# Patient Record
Sex: MALE | Race: WHITE | NOT HISPANIC OR LATINO | Employment: FULL TIME | URBAN - METROPOLITAN AREA
[De-identification: names, ages, dates, MRNs, and addresses within clinical notes are randomized per-mention and may not be internally consistent; named-entity substitution may affect disease eponyms.]

---

## 2017-01-03 ENCOUNTER — HOSPITAL ENCOUNTER (OUTPATIENT)
Dept: NUCLEAR MEDICINE | Facility: HOSPITAL | Age: 42
Discharge: HOME | End: 2017-01-03

## 2017-01-03 ENCOUNTER — HOSPITAL ENCOUNTER (OUTPATIENT)
Dept: CARDIOLOGY | Facility: HOSPITAL | Age: 42
Discharge: HOME | End: 2017-01-03

## 2017-01-10 ENCOUNTER — OFFICE VISIT - HEALTHEAST (OUTPATIENT)
Dept: CARDIOLOGY | Facility: CLINIC | Age: 42
End: 2017-01-10

## 2017-01-10 ENCOUNTER — COMMUNICATION - HEALTHEAST (OUTPATIENT)
Dept: CARDIOLOGY | Facility: CLINIC | Age: 42
End: 2017-01-10

## 2017-01-10 DIAGNOSIS — I42.8 OTHER PRIMARY CARDIOMYOPATHIES: ICD-10-CM

## 2017-01-10 ASSESSMENT — MIFFLIN-ST. JEOR: SCORE: 2375.88

## 2017-01-12 ENCOUNTER — COMMUNICATION - HEALTHEAST (OUTPATIENT)
Dept: CARDIOLOGY | Facility: CLINIC | Age: 42
End: 2017-01-12

## 2017-02-08 ENCOUNTER — OFFICE VISIT - HEALTHEAST (OUTPATIENT)
Dept: CARDIOLOGY | Facility: CLINIC | Age: 42
End: 2017-02-08

## 2017-02-08 DIAGNOSIS — I10 ESSENTIAL HYPERTENSION: ICD-10-CM

## 2017-02-08 DIAGNOSIS — I42.8 OTHER PRIMARY CARDIOMYOPATHIES: ICD-10-CM

## 2017-02-08 ASSESSMENT — MIFFLIN-ST. JEOR: SCORE: 2376.78

## 2017-02-13 ENCOUNTER — COMMUNICATION - HEALTHEAST (OUTPATIENT)
Dept: FAMILY MEDICINE | Facility: CLINIC | Age: 42
End: 2017-02-13

## 2017-03-20 ENCOUNTER — HOSPITAL ENCOUNTER (OUTPATIENT)
Dept: CARDIOLOGY | Facility: HOSPITAL | Age: 42
Discharge: HOME | End: 2017-03-20

## 2017-03-20 DIAGNOSIS — I42.8 OTHER PRIMARY CARDIOMYOPATHIES: ICD-10-CM

## 2017-03-20 LAB
AORTIC ROOT: 3.6 CM
AORTIC VALVE MEAN VELOCITY: 66.3 CM/S
AV DIMENSIONLESS INDEX VTI: 0.7
AV MEAN GRADIENT: 2 MMHG
AV PEAK GRADIENT: 3.9 MMHG
AV VALVE AREA: 2.3 CM2
AV VELOCITY RATIO: 0.7
DOP CALC AO PEAK VEL: 98.2 CM/S
DOP CALC AO VTI: 19.4 CM
DOP CALC LVOT AREA: 3.46 CM2
DOP CALC LVOT DIAMETER: 2.1 CM
DOP CALC LVOT PEAK VEL: 72.3 CM/S
DOP CALC LVOT STROKE VOLUME: 44.3 CM3
DOP CALCLVOT PEAK VEL VTI: 12.8 CM
ECHO EJECTION FRACTION ESTIMATED: 40 %
EJECTION FRACTION: 49 % (ref 55–75)
FRACTIONAL SHORTENING: 23.9 % (ref 28–44)
INTERVENTRICULAR SEPTUM IN END DIASTOLE: 1.05 CM (ref 0.6–1)
IVS/PW RATIO: 0.8
LA AREA 1: 21.4 CM2
LA AREA 2: 18.4 CM2
LEFT ATRIUM LENGTH: 4.78 CM
LEFT ATRIUM SIZE: 3.6 CM
LEFT ATRIUM TO AORTIC ROOT RATIO: 1 NO UNITS
LEFT ATRIUM VOLUME: 70 CM3
LEFT VENTRICLE DIASTOLIC VOLUME: 66.5 CM3 (ref 62–150)
LEFT VENTRICLE SYSTOLIC VOLUME: 33.8 CM3 (ref 21–61)
LEFT VENTRICULAR INTERNAL DIMENSION IN DIASTOLE: 6.12 CM (ref 4.2–5.8)
LEFT VENTRICULAR INTERNAL DIMENSION IN SYSTOLE: 4.66 CM (ref 2.5–4)
LEFT VENTRICULAR MASS: 328.1 G
LEFT VENTRICULAR OUTFLOW TRACT MEAN GRADIENT: 1 MMHG
LEFT VENTRICULAR OUTFLOW TRACT MEAN VELOCITY: 49.1 CM/S
LEFT VENTRICULAR OUTFLOW TRACT PEAK GRADIENT: 2 MMHG
LEFT VENTRICULAR POSTERIOR WALL IN END DIASTOLE: 1.37 CM (ref 0.6–1)
MITRAL VALVE DECELERATION SLOPE: 3600 MM/S2
MITRAL VALVE E/A RATIO: 1.2
MITRAL VALVE PRESSURE HALF-TIME: 58 MS
MV DECELERATION TIME: 222 MS
MV PEAK A VELOCITY: 55.3 CM/S
MV PEAK E VELOCITY: 67.4 CM/S
MV VALVE AREA PRESSURE 1/2 METHOD: 3.8 CM2
RIGHT VENTRICULAR INTERNAL DIMENSION IN DYSTOLE: 2.56 CM
TRICUSPID VALVE ANULAR PLANE SYSTOLIC EXCURSION: 2 CM

## 2017-03-27 ENCOUNTER — OFFICE VISIT - HEALTHEAST (OUTPATIENT)
Dept: CARDIOLOGY | Facility: CLINIC | Age: 42
End: 2017-03-27

## 2017-03-27 DIAGNOSIS — I10 ESSENTIAL HYPERTENSION: ICD-10-CM

## 2017-03-27 DIAGNOSIS — I42.8 OTHER PRIMARY CARDIOMYOPATHIES: ICD-10-CM

## 2017-03-27 DIAGNOSIS — I21.9 MYOCARDIAL INFARCTION (H): ICD-10-CM

## 2017-03-27 ASSESSMENT — MIFFLIN-ST. JEOR: SCORE: 2422.14

## 2017-03-28 ENCOUNTER — COMMUNICATION - HEALTHEAST (OUTPATIENT)
Dept: CARDIOLOGY | Facility: CLINIC | Age: 42
End: 2017-03-28

## 2017-03-28 DIAGNOSIS — I10 HYPERTENSION: ICD-10-CM

## 2017-10-20 ENCOUNTER — OFFICE VISIT - HEALTHEAST (OUTPATIENT)
Dept: CARDIOLOGY | Facility: CLINIC | Age: 42
End: 2017-10-20

## 2017-10-20 DIAGNOSIS — I25.10 CAD (CORONARY ARTERY DISEASE): ICD-10-CM

## 2017-10-20 DIAGNOSIS — I50.20 HEART FAILURE WITH REDUCED EJECTION FRACTION (H): ICD-10-CM

## 2017-10-20 ASSESSMENT — MIFFLIN-ST. JEOR: SCORE: 2462.97

## 2017-10-30 ENCOUNTER — HOSPITAL ENCOUNTER (OUTPATIENT)
Dept: CARDIOLOGY | Facility: HOSPITAL | Age: 42
Discharge: HOME | End: 2017-10-30
Attending: NURSE PRACTITIONER

## 2017-10-30 DIAGNOSIS — I50.20 HEART FAILURE WITH REDUCED EJECTION FRACTION (H): ICD-10-CM

## 2017-10-30 LAB
AORTIC ROOT: 3.5 CM
BSA FOR ECHO PROCEDURE: 2.79 M2
CV BLOOD PRESSURE: NORMAL MMHG
CV ECHO HEIGHT: 71 IN
CV ECHO WEIGHT: 343 LBS
DOP CALC LVOT AREA: 4.52 CM2
DOP CALC LVOT DIAMETER: 2.4 CM
DOP CALC LVOT PEAK VEL: 65.8 CM/S
DOP CALC LVOT STROKE VOLUME: 59.2 CM3
DOP CALCLVOT PEAK VEL VTI: 13.1 CM
ECHO EJECTION FRACTION ESTIMATED: 40 %
EJECTION FRACTION: 49 % (ref 55–75)
FRACTIONAL SHORTENING: 31.2 % (ref 28–44)
INTERVENTRICULAR SEPTUM IN END DIASTOLE: 1.17 CM (ref 0.6–1)
IVS/PW RATIO: 1
LEFT ATRIUM SIZE: 4.6 CM
LEFT ATRIUM TO AORTIC ROOT RATIO: 1.31 NO UNITS
LEFT VENTRICLE DIASTOLIC VOLUME INDEX: 47 CM3/M2 (ref 34–74)
LEFT VENTRICLE DIASTOLIC VOLUME: 131 CM3 (ref 62–150)
LEFT VENTRICLE MASS INDEX: 108.8 G/M2
LEFT VENTRICLE SYSTOLIC VOLUME INDEX: 23.8 CM3/M2 (ref 11–31)
LEFT VENTRICLE SYSTOLIC VOLUME: 66.5 CM3 (ref 21–61)
LEFT VENTRICULAR INTERNAL DIMENSION IN DIASTOLE: 6 CM (ref 4.2–5.8)
LEFT VENTRICULAR INTERNAL DIMENSION IN SYSTOLE: 4.13 CM (ref 2.5–4)
LEFT VENTRICULAR MASS: 303.5 G
LEFT VENTRICULAR OUTFLOW TRACT MEAN GRADIENT: 1 MMHG
LEFT VENTRICULAR OUTFLOW TRACT MEAN VELOCITY: 44.6 CM/S
LEFT VENTRICULAR OUTFLOW TRACT PEAK GRADIENT: 2 MMHG
LEFT VENTRICULAR POSTERIOR WALL IN END DIASTOLE: 1.17 CM (ref 0.6–1)
LV STROKE VOLUME INDEX: 21.2 ML/M2
MITRAL VALVE E/A RATIO: 0.7
MV DECELERATION TIME: 324 MS
MV E'TISSUE VEL-LAT: 6.38 CM/S
MV LATERAL E/E' RATIO: 7.1
MV PEAK A VELOCITY: 64 CM/S
MV PEAK E VELOCITY: 45.6 CM/S
NUC REST DIASTOLIC VOLUME INDEX: 5488 LBS
NUC REST SYSTOLIC VOLUME INDEX: 71 IN
TRICUSPID VALVE ANULAR PLANE SYSTOLIC EXCURSION: 2 CM

## 2017-10-30 ASSESSMENT — MIFFLIN-ST. JEOR: SCORE: 2462.97

## 2017-11-01 ENCOUNTER — COMMUNICATION - HEALTHEAST (OUTPATIENT)
Dept: CARDIOLOGY | Facility: CLINIC | Age: 42
End: 2017-11-01

## 2017-11-01 DIAGNOSIS — I42.9 CARDIOMYOPATHY (H): ICD-10-CM

## 2017-11-06 ENCOUNTER — OFFICE VISIT - HEALTHEAST (OUTPATIENT)
Dept: CARDIOLOGY | Facility: CLINIC | Age: 42
End: 2017-11-06

## 2017-11-06 DIAGNOSIS — I42.9 CARDIOMYOPATHY (H): ICD-10-CM

## 2017-11-06 ASSESSMENT — MIFFLIN-ST. JEOR: SCORE: 2467.5

## 2017-11-20 ENCOUNTER — OFFICE VISIT - HEALTHEAST (OUTPATIENT)
Dept: CARDIOLOGY | Facility: CLINIC | Age: 42
End: 2017-11-20

## 2017-11-20 DIAGNOSIS — I10 ESSENTIAL HYPERTENSION: ICD-10-CM

## 2017-11-20 DIAGNOSIS — I25.5 ISCHEMIC CARDIOMYOPATHY: ICD-10-CM

## 2017-11-20 ASSESSMENT — MIFFLIN-ST. JEOR: SCORE: 2476.58

## 2019-03-06 ENCOUNTER — COMMUNICATION - HEALTHEAST (OUTPATIENT)
Dept: FAMILY MEDICINE | Facility: CLINIC | Age: 44
End: 2019-03-06

## 2020-05-28 ENCOUNTER — HOSPITAL ENCOUNTER (OUTPATIENT)
Dept: GENERAL RADIOLOGY | Facility: CLINIC | Age: 45
Discharge: HOME | End: 2020-05-28
Admitting: RADIOLOGY

## 2020-05-28 DIAGNOSIS — M54.50 LOW BACK PAIN: ICD-10-CM

## 2020-05-28 DIAGNOSIS — M99.03 SEGMENTAL AND SOMATIC DYSFUNCTION OF LUMBAR REGION: ICD-10-CM

## 2020-05-28 DIAGNOSIS — M54.50 LUMBAGO: ICD-10-CM

## 2020-05-28 PROCEDURE — 72100 X-RAY EXAM L-S SPINE 2/3 VWS: CPT

## 2020-09-03 ENCOUNTER — COMMUNICATION - HEALTHEAST (OUTPATIENT)
Dept: CARDIOLOGY | Facility: CLINIC | Age: 45
End: 2020-09-03

## 2020-09-09 ENCOUNTER — OFFICE VISIT - HEALTHEAST (OUTPATIENT)
Dept: CARDIOLOGY | Facility: CLINIC | Age: 45
End: 2020-09-09

## 2020-09-09 DIAGNOSIS — E78.2 MIXED HYPERLIPIDEMIA: ICD-10-CM

## 2020-09-09 DIAGNOSIS — I10 ESSENTIAL HYPERTENSION: ICD-10-CM

## 2020-09-09 DIAGNOSIS — I42.9 CARDIOMYOPATHY (H): ICD-10-CM

## 2020-09-09 DIAGNOSIS — I25.5 ISCHEMIC CARDIOMYOPATHY: ICD-10-CM

## 2020-09-09 DIAGNOSIS — I25.10 CORONARY ARTERY DISEASE INVOLVING NATIVE CORONARY ARTERY OF NATIVE HEART WITHOUT ANGINA PECTORIS: ICD-10-CM

## 2020-09-09 DIAGNOSIS — Z01.810 PRE-OPERATIVE CARDIOVASCULAR EXAMINATION: ICD-10-CM

## 2020-09-09 RX ORDER — LISINOPRIL 5 MG/1
5 TABLET ORAL DAILY
Qty: 31 TABLET | Refills: 11 | Status: SHIPPED | OUTPATIENT
Start: 2020-09-09

## 2020-09-09 RX ORDER — ATORVASTATIN CALCIUM 80 MG/1
80 TABLET, FILM COATED ORAL AT BEDTIME
Qty: 31 TABLET | Refills: 11 | Status: SHIPPED | OUTPATIENT
Start: 2020-09-09

## 2020-09-09 RX ORDER — ASPIRIN 81 MG/1
81 TABLET, CHEWABLE ORAL DAILY
Qty: 30 TABLET | Refills: 11 | Status: SHIPPED | COMMUNITY
Start: 2020-09-09

## 2020-09-09 RX ORDER — METOPROLOL SUCCINATE 25 MG/1
25 TABLET, EXTENDED RELEASE ORAL 2 TIMES DAILY
Qty: 30 TABLET | Refills: 11 | Status: SHIPPED | OUTPATIENT
Start: 2020-09-09

## 2020-09-09 ASSESSMENT — MIFFLIN-ST. JEOR: SCORE: 2612.65

## 2021-05-24 ENCOUNTER — RECORDS - HEALTHEAST (OUTPATIENT)
Dept: ADMINISTRATIVE | Facility: CLINIC | Age: 46
End: 2021-05-24

## 2021-05-25 ENCOUNTER — RECORDS - HEALTHEAST (OUTPATIENT)
Dept: ADMINISTRATIVE | Facility: CLINIC | Age: 46
End: 2021-05-25

## 2021-05-27 ENCOUNTER — RECORDS - HEALTHEAST (OUTPATIENT)
Dept: ADMINISTRATIVE | Facility: CLINIC | Age: 46
End: 2021-05-27

## 2021-05-28 ENCOUNTER — RECORDS - HEALTHEAST (OUTPATIENT)
Dept: ADMINISTRATIVE | Facility: CLINIC | Age: 46
End: 2021-05-28

## 2021-05-30 VITALS — HEIGHT: 71 IN | WEIGHT: 315 LBS | BODY MASS INDEX: 44.1 KG/M2

## 2021-05-30 VITALS — BODY MASS INDEX: 44.1 KG/M2 | HEIGHT: 71 IN | WEIGHT: 315 LBS

## 2021-05-31 VITALS — HEIGHT: 71 IN | WEIGHT: 315 LBS | BODY MASS INDEX: 44.1 KG/M2

## 2021-05-31 VITALS — BODY MASS INDEX: 44.1 KG/M2 | WEIGHT: 315 LBS | HEIGHT: 71 IN

## 2021-05-31 VITALS — WEIGHT: 315 LBS | BODY MASS INDEX: 44.1 KG/M2 | HEIGHT: 71 IN

## 2021-05-31 VITALS — BODY MASS INDEX: 44.1 KG/M2 | HEIGHT: 71 IN | WEIGHT: 315 LBS

## 2021-06-04 VITALS
BODY MASS INDEX: 44.1 KG/M2 | HEIGHT: 71 IN | DIASTOLIC BLOOD PRESSURE: 84 MMHG | OXYGEN SATURATION: 95 % | SYSTOLIC BLOOD PRESSURE: 128 MMHG | RESPIRATION RATE: 20 BRPM | HEART RATE: 105 BPM | WEIGHT: 315 LBS

## 2021-06-08 NOTE — PROGRESS NOTES
Assessment/Plan:     1. Ischemic cardiomyopathy with systolic dysfunction, NYHA class II: Azael Guzman appears well compensated.  We discussed monitoring heart failure symptoms, following a low-sodium diet, monitoring daily weights, and medication titration.  I increased his metoprolol to 50 mg daily today.  I encouraged him to set an alarm on his phone to remember to take his medications.  He states he takes his medications usually 5 out of 7 days.  We discussed the importance of medications.  I also encouraged him to start exercising on a regular basis.      Heart failure treatment includes:  - Beta blocker therapy with metroprolol succinate 50 mg daily  - ACEI therapy with lisinopril 5 mg daily    Follow-up in the heart failure clinic in 3 weeks    Subjective:     Azael Guzman is seen at [facility] heart failure clinic today for continued follow-up.  He follows up for ischemic cardiomyopathy with systolic dysfunction. His most recent echocardiogram was done November 18, 2016 which showed an ejection fraction of 25%.  He had a nuclear stress test on generator third 2017 which showed an ejection fraction of 37% and showed no change when compared to his previous study.  Dr. Harrington recommended coronary angiogram if he complains of chest discomfort or pain.  Past medical history is significant for hypertension, coronary artery disease, and hyperlipidemia. He had an MI in 2014 which he received PCI to his LAD.    During the last clinic visit, I increased his metoprolol to 50 mg daily.  He states he did not increase his metoprolol at our last visit.  He continues to have mild dyspnea on exertion.  He denies any other acute heart failure symptoms.  He denies fatigue, lightheadedness, shortness of breath, orthopnea, PND, palpitations, chest pain, abdominal fullness/bloating and lower extremity edema.      He is monitoring home weights which are stable between 321-322 pounds.  He is following a  low sodium diet.     Review of Systems:   General: WNL  Eyes: WNL  Ears/Nose/Throat: WNL  Lungs: WNL  Heart: WNL  Stomach: WNL  Bladder: WNL  Muscle/Joints: WNL  Skin: WNL  Nervous System: WNL  Mental Health: WNL     Blood: WNL     Patient Active Problem List   Diagnosis     Obesity     Coronary Artery Disease     Cardiomyopathy     Warts     Plantar Warts     Limb Pain     Contusion Of The Left Hand With Intact Skin Surface     Contusion Of The Right Leg With Intact Skin Surface     Hypertension     Hyperlipidemia     Scrotal trauma       Past Medical History   Diagnosis Date     Cardiomyopathy      Coronary artery disease      High cholesterol      Hyperlipidemia      Hypertension      MI (myocardial infarction) 5/2014     Seizure      at age [age]. none since       Past Surgical History   Procedure Laterality Date     Coronary stent placement  5/14     LAD     Joint replacement       Cardiac catheterization       Orchiectomy Right 7/31/2015     Procedure: Scrotal Exploration, Evacuation of Hematoma, Repair of Right Testical;  Surgeon: Azael Woods MD;  Location: [facility];  Service:      Angioplasty         Family History   Problem Relation Age of Onset     Heart disease Mother      Heart disease Maternal Aunt      Heart attack Maternal Uncle      Heart disease Maternal Grandmother      Heart disease Maternal Grandfather        Social History     Social History     Marital status: Single     Spouse name: N/A     Number of children: N/A     Years of education: N/A     Occupational History     Not on file.     Social History Main Topics     Smoking status: Former Smoker     Quit date: 5/18/2014     Smokeless tobacco: Never Used     Alcohol use Yes      Comment: once a month     Drug use: No     Sexual activity: Not on file     Other Topics Concern     Not on file     Social History Narrative       Current Outpatient Prescriptions   Medication Sig Dispense Refill     aspirin 81 MG EC tablet Take 1  "tablet (81 mg total) by mouth daily. 150 tablet 2     atorvastatin (LIPITOR) 80 MG tablet Take 1 tablet (80 mg total) by mouth bedtime. 90 tablet 4     lisinopril (PRINIVIL,ZESTRIL) 5 MG tablet Take 1 tablet (5 mg total) by mouth daily. 90 tablet 0     metoprolol succinate (TOPROL-XL) 25 MG Take 2 tablets (50 mg total) by mouth daily. 60 tablet 11     nitroglycerin (NITROSTAT) 0.4 MG SL tablet Place 1 tablet (0.4 mg total) under the tongue every 5 (five) minutes as needed for chest pain. 25 tablet 2     No current facility-administered medications for this visit.        Allergies   Allergen Reactions     Codeine      \"emotional lability\"       Objective:     Vitals:    01/10/17 1259   BP: 128/80   Pulse: 92   Resp: 16     Wt Readings from Last 3 Encounters:   01/10/17 (!) 323 lb 12.8 oz (146.9 kg)   12/27/16 (!) 300 lb (136.1 kg)   12/07/16 (!) 322 lb (146.1 kg)       General Appearance:   Alert, cooperative and in no acute distress.   HEENT:  No scleral icterus; the mucous membranes were pink and moist.   Neck: JVP is difficult to assess due to the patient's obesity and body habitus.    Chest: The spine was straight. The chest was symmetric.   Lungs:   Respirations unlabored; the lungs are clear to auscultation.   Cardiovascular:   Regular rhythm. S1 and S2 without murmur, clicks or rubs. Radial and posterior tibial pulses are intact and symmetrical.    Abdomen:  Soft, nontender, nondistended, bowel sounds present   Extremities: No cyanosis, clubbing, or edema.   Skin: No xanthelasma.   Neurologic: Mood and affect are appropriate.         Lab Review   Lab Results   Component Value Date    CREATININE 0.99 12/07/2016    BUN 15 12/07/2016     12/07/2016    K 4.9 12/07/2016     12/07/2016    CO2 21 (L) 12/07/2016     No results found for: BNP  No results found for: BNP  CREATININE (mg/dL)   Date Value   12/07/2016 0.99   07/31/2015 0.95   01/27/2015 0.92   01/26/2015 0.83 "       Cardiographics  Echocardiogram: 11/18/2016  Summary  Left ventricular ejection fraction is visually estimated to be 25 %.  Overall left ventricular systolic function is severely depressed.  No significant valvular abnormalities.    NM pharmacological stress test: 1/3/2017  Comments     LVEF 37%    A prior study was conducted on 11/3/2014.    This study has no change when compared with the prior study         25 minutes were spent face to face with the patient with greater than 50% spent on education and counseling.      Rosina Robles, [facility]   Heart Failure Clinic

## 2021-06-08 NOTE — PROGRESS NOTES
Assessment/Plan:     1. Ischemic cardiomyopathy with systolic dysfunction, NYHA class II: Azael Guzman appears well compensated.  We discussed monitoring heart failure symptoms, following a low-sodium diet, monitoring daily weights, and heart failure treatment.  No changes to medications due to symptoms of dizziness now improved.  I have ordered an echocardiogram to reassess LVEF.       Heart failure treatment includes:  - Beta blocker therapy with metroprolol succinate 50 mg every other day and 25 mg the other days  - ACEI therapy with lisinopril 5 mg daily    Follow-up in the heart failure clinic after your echocardiogram    Subjective:     Azael Guzman is [facility] heart failure clinic today for continued follow-up. He follows up for ischemic cardiomyopathy with systolic dysfunction. His most recent echocardiogram was done November 18, 2016 which showed an ejection fraction of 25%.  He had a nuclear stress test on generator third 2017 which showed an ejection fraction of 37% and showed no change when compared to his previous study. Dr. Harrington recommended coronary angiogram if he complains of chest discomfort or pain. Past medical history is significant for hypertension, coronary artery disease, and hyperlipidemia. He had an MI in 2014 which he received PCI to his LAD.     During the last clinic visit, I increased his metoprolol to 50 mg daily.  He called the clinic a few days after the increase and complained of headaches and dizziness.  He was taking metoprolol 50 mg one day and then 25 mg the other day which has helped his symptoms.  He continues to have chronic fatigue and dyspnea on exertion which have not worsened.  He denies lightheadedness, shortness of breath, orthopnea, PND, palpitations, chest pain, abdominal fullness/bloating and lower extremity edema.      He is monitoring home weights which are stable between 315-320 pounds.  He is following a low sodium diet.      Review of Systems:   General: WNL  Eyes: WNL  Ears/Nose/Throat: WNL  Lungs: WNL  Heart: WNL  Stomach: WNL  Bladder: WNL  Muscle/Joints: WNL  Skin: WNL  Nervous System: WNL  Mental Health: WNL     Blood: WNL     Patient Active Problem List   Diagnosis     Obesity     Coronary Artery Disease     Cardiomyopathy     Warts     Plantar Warts     Limb Pain     Contusion Of The Left Hand With Intact Skin Surface     Contusion Of The Right Leg With Intact Skin Surface     Hypertension     Hyperlipidemia     Scrotal trauma       Past Medical History:   Diagnosis Date     Cardiomyopathy      Coronary artery disease      High cholesterol      Hyperlipidemia      Hypertension      MI (myocardial infarction) 5/2014     Seizure     at age [age]. none since       Past Surgical History:   Procedure Laterality Date     ANGIOPLASTY       CARDIAC CATHETERIZATION       CORONARY STENT PLACEMENT  5/14    LAD     JOINT REPLACEMENT       ORCHIECTOMY Right 7/31/2015    Procedure: Scrotal Exploration, Evacuation of Hematoma, Repair of Right Testical;  Surgeon: Azael Woods MD;  Location: [facility] [place];  Service:        Family History   Problem Relation Age of Onset     Heart disease Mother      Heart disease Maternal Aunt      Heart attack Maternal Uncle      Heart disease Maternal Grandmother      Heart disease Maternal Grandfather        Social History     Social History     Marital status: Single     Spouse name: N/A     Number of children: N/A     Years of education: N/A     Occupational History     Not on file.     Social History Main Topics     Smoking status: Former Smoker     Quit date: 5/18/2014     Smokeless tobacco: Never Used     Alcohol use Yes      Comment: once a month     Drug use: No     Sexual activity: Not on file     Other Topics Concern     Not on file     Social History Narrative       Current Outpatient Prescriptions   Medication Sig Dispense Refill     aspirin 81 MG EC tablet Take 1 tablet (81 mg  "total) by mouth daily. 150 tablet 2     atorvastatin (LIPITOR) 80 MG tablet Take 1 tablet (80 mg total) by mouth bedtime. 90 tablet 4     lisinopril (PRINIVIL,ZESTRIL) 5 MG tablet Take 1 tablet (5 mg total) by mouth daily. 30 tablet 11     metoprolol succinate (TOPROL-XL) 25 MG Take 2 tablets (50 mg total) by mouth daily. 60 tablet 11     nitroglycerin (NITROSTAT) 0.4 MG SL tablet Place 1 tablet (0.4 mg total) under the tongue every 5 (five) minutes as needed for chest pain. 25 tablet 2     No current facility-administered medications for this visit.        Allergies   Allergen Reactions     Codeine      \"emotional lability\"       Objective:     Vitals:    02/08/17 1527   BP: 120/74   Pulse: 88   Resp: 18   SpO2: 99%     Wt Readings from Last 3 Encounters:   02/08/17 (!) 324 lb (147 kg)   01/10/17 (!) 323 lb 12.8 oz (146.9 kg)   12/27/16 (!) 300 lb (136.1 kg)       General Appearance:   Alert, cooperative and in no acute distress.   HEENT:  No scleral icterus; the mucous membranes were pink and moist.   Neck: JVP is difficult to assess due to the patient's obesity and body habitus.    Chest: The spine was straight. The chest was symmetric.   Lungs:   Respirations unlabored; the lungs are clear to auscultation.   Cardiovascular:   Regular rhythm. S1 and S2 without murmur, clicks or rubs. Radial and posterior tibial pulses are intact and symmetrical.    Abdomen:  Soft, nontender, nondistended, bowel sounds present   Extremities: No cyanosis, clubbing, or edema.   Skin: No xanthelasma.   Neurologic: Mood and affect are appropriate.         Lab Review   Lab Results   Component Value Date    CREATININE 0.99 12/07/2016    BUN 15 12/07/2016     12/07/2016    K 4.9 12/07/2016     12/07/2016    CO2 21 (L) 12/07/2016     No results found for: BNP  No results found for: BNP  CREATININE (mg/dL)   Date Value   12/07/2016 0.99   07/31/2015 0.95   01/27/2015 0.92   01/26/2015 0.83       Cardiographics  Echocardiogram: " 11/18/2016  Summary  Left ventricular ejection fraction is visually estimated to be 25 %.  Overall left ventricular systolic function is severely depressed.  No significant valvular abnormalities.     NM pharmacological stress test: 1/3/2017  Comments     LVEF 37%    A prior study was conducted on 11/3/2014.    This study has no change when compared with the prior study                  25 minutes were spent face to face with the patient with greater than 50% spent on education and counseling.      Rosina Robles, [facility]   Heart Failure Clinic

## 2021-06-09 NOTE — PROGRESS NOTES
Assessment/Plan:     1. Ischemic cardiomyopathy with systolic dysfunction, NYHA class II: Azael Guzman appears well compensated.  No signs of fluid retention today.  We discussed the results of his past echocardiogram.  His heart function has improved from 25-40%.  I increase his lisinopril to 10 mg daily.  BMP pending.  I encouraged him to call the clinic if he experiences any dizziness or lightheadedness.  I encouraged him to start weighing himself on a daily basis and trying to follow a low-sodium diet more strictly.  I also encouraged him to start exercising on a regular basis.      Heart failure treatment includes:  - Beta blocker therapy with metroprolol succinate 50 mg every other day and 25 mg the other days  - ACEI therapy with lisinopril 10 mg daily    Follow-up in the heart failure clinic in 3 weeks    Subjective:     Azael Guzman is seen at [facility] heart failure clinic today for continued follow-up.  His mother accompanies him today.  He follows up for ischemic cardiomyopathy with systolic dysfunction.  His most recent echocardiogram was done March 20, 2017 which showed ejection fraction of 40%.  This is improved from his last echocardiogram that was done November 18, 2016 which showed ejection fraction of 25%. Past medical history is significant for hypertension, coronary artery disease, and hyperlipidemia. He had an MI in 2014 which he received PCI to his LAD.     Today, he comes in with complaints of dyspnea on exertion that has worsened over the last few weeks.  He states his dizziness has improved.  He also has symptoms of fatigue.  He denies any other acute heart failure symptoms.  He denies lightheadedness, shortness of breath, orthopnea, PND, palpitations, chest pain, abdominal fullness/bloating and lower extremity edema.      He is not monitoring home weights.  He is trying to follow a low sodium diet.     Review of Systems:   General: WNL  Eyes:  WNL  Ears/Nose/Throat: WNL  Lungs: Cough, Shortness of Breath  Heart: Shortness of Breath with activity  Stomach: WNL  Bladder: WNL  Muscle/Joints: WNL  Skin: WNL  Nervous System: WNL  Mental Health: WNL     Blood: WNL     Patient Active Problem List   Diagnosis     Obesity     Coronary Artery Disease     Cardiomyopathy     Warts     Plantar Warts     Limb Pain     Contusion Of The Left Hand With Intact Skin Surface     Contusion Of The Right Leg With Intact Skin Surface     Hypertension     Hyperlipidemia     Scrotal trauma     Myocardial infarction       Past Medical History:   Diagnosis Date     Cardiomyopathy      Coronary artery disease      High cholesterol      Hyperlipidemia      Hypertension      MI (myocardial infarction) 5/2014     Seizure     at age [age]. none since       Past Surgical History:   Procedure Laterality Date     ANGIOPLASTY       CARDIAC CATHETERIZATION       CORONARY STENT PLACEMENT  5/14    LAD     JOINT REPLACEMENT       ORCHIECTOMY Right 7/31/2015    Procedure: Scrotal Exploration, Evacuation of Hematoma, Repair of Right Testical;  Surgeon: Azael Woods MD;  Location: [facility];  Service:        Family History   Problem Relation Age of Onset     Heart disease Mother      Heart disease Maternal Aunt      Heart attack Maternal Uncle      Heart disease Maternal Grandmother      Heart disease Maternal Grandfather        Social History     Social History     Marital status: Single     Spouse name: N/A     Number of children: N/A     Years of education: N/A     Occupational History     Not on file.     Social History Main Topics     Smoking status: Former Smoker     Quit date: 5/18/2014     Smokeless tobacco: Never Used     Alcohol use Yes      Comment: once a month     Drug use: No     Sexual activity: Not on file     Other Topics Concern     Not on file     Social History Narrative       Current Outpatient Prescriptions   Medication Sig Dispense Refill     aspirin 81 MG  "EC tablet Take 1 tablet (81 mg total) by mouth daily. 150 tablet 2     atorvastatin (LIPITOR) 80 MG tablet Take 1 tablet (80 mg total) by mouth bedtime. 90 tablet 4     metoprolol succinate (TOPROL-XL) 25 MG Take 2 tablets (50 mg total) by mouth daily. 60 tablet 11     nitroglycerin (NITROSTAT) 0.4 MG SL tablet Place 1 tablet (0.4 mg total) under the tongue every 5 (five) minutes as needed for chest pain. 25 tablet 2     lisinopril (PRINIVIL,ZESTRIL) 10 MG tablet Take 1 tablet (10 mg total) by mouth daily. 90 tablet 3     No current facility-administered medications for this visit.        Allergies   Allergen Reactions     Codeine      \"emotional lability\"       Objective:     Vitals:    03/27/17 1338   BP: 140/82   Pulse: 90   SpO2: 95%     Wt Readings from Last 3 Encounters:   03/27/17 (!) 334 lb (151.5 kg)   02/08/17 (!) 324 lb (147 kg)   01/10/17 (!) 323 lb 12.8 oz (146.9 kg)       General Appearance:   Alert, cooperative and in no acute distress.   HEENT:  No scleral icterus; the mucous membranes were pink and moist.   Neck: JVP is difficult to assess due to the patient's obesity and body habitus.    Chest: The spine was straight. The chest was symmetric.   Lungs:   Respirations unlabored; the lungs are clear to auscultation.   Cardiovascular:   Regular rhythm. S1 and S2 without murmur, clicks or rubs. Radial and posterior tibial pulses are intact and symmetrical.    Abdomen:  Soft, nontender, nondistended, bowel sounds present   Extremities: No cyanosis, clubbing, or edema.   Skin: No xanthelasma.   Neurologic: Mood and affect are appropriate.         Lab Review   BMP pending          Cardiographics  Echocardiogram: 3/20/2017  Summary     Left Ventricle: The estimated left ventricular ejection fraction is 40%. This represents a moderately decreased ejection fraction. Cavity is mildly increased.    Right Ventricle: Normal size and systolic function. TAPSE is normal, which is consistent with normal right " ventricular systolic function.    No hemodynamically significant valvular heart abnormalities.    When compared to the previous study dated 11/18/2016, there is mild interval improvement in LF systolic function.              25 minutes were spent face to face with the patient with greater than 50% spent on education and counseling.      Rosina Robles, [facility]   Heart Failure Clinic

## 2021-06-11 NOTE — TELEPHONE ENCOUNTER
"Incoming call from patient whom needs to start Testosterone therapy, and needs to be seen by Cardiology before they will start. Pt last seen by Cardiology 3 years ago, so needs a \"new consult spot\". Informed and discussed with patient. Search by  for a sooner appointment with a cardiologist, and given 2 options for tomorrow at [facility]. Pt appt changed to tomorrow, patient appreciative of the call and help. Instructed patient to bring labs and paperwork from the [facility]; labs and documentation needed to start this testosterone therapy. Pt verbalized understanding. Wellness screen completed at time of call. ROBERT,Rn   "

## 2021-06-11 NOTE — TELEPHONE ENCOUNTER
----- Message from Carol De León sent at 9/2/2020  3:07 PM CDT -----  General phone call:    Caller: Patient  Primary cardiologist: Balbir Singh  Detailed reason for call: Patient is scheduled to establish care with Balbir Singh on 9/16. Patient would like phone call to discuss needing authorization from Heart provider regarding his testosterone care at [facility] in [place].  New or active symptoms? High testosterone levels  Best phone number: [phone]  Best time to contact: anythime  Ok to leave a detailed message? yes  Device? no    Additional Info: [facility] phone: [phone] fax: [phone]

## 2021-06-11 NOTE — TELEPHONE ENCOUNTER
Wellness Screening Tool  Symptom Screening:  Do you have one of the following NEW symptoms:    Fever (subjective or >100.0)?  No    A new cough?  No    Shortness of breath?  No     Chills? No     New loss of taste or smell? No     Generalized body aches? No     New persistent headache? No     New sore throat? No     Nausea, vomiting, or diarrhea?  No    Within the past 2 weeks, have you been exposed to someone with a known positive illness below:    COVID-19 (known or suspected)?  No    Chicken pox?  No    Mealses?  No    Pertussis?  No    Patient notified of visitor policy- They may have one person accompany them to their appointment, but they will need to wear a mask and will be screened upon arrival for symptoms: Yes  Pt informed to wear a mask: Yes  Pt notified if they develop any symptoms listed above, prior to their appointment, they are to call the clinic directly at [phone] for further instructions.  Yes  Patient's appointment status: Patient will be seen in clinic as scheduled on 9/9/20. CMM,Rn

## 2021-06-13 NOTE — PROGRESS NOTES
[facility] Heart Care Note    Assessment / Plan:    Mr Guzman has established coronary disease, having had an anterior myocardial infarction in 2014.  He has a mild to moderate persistent cardiomyopathy, confirmed on his recent transthoracic echocardiogram.    He will therefore benefit from ongoing beta-blockade as well as ACE inhibition.  He has had difficulty with medication compliance in the past, and the importance of adherence to this regimen was emphasized.  He also knows that the doses of the medications will be gradually uptitrated over the coming months through the heart failure clinic.    He was also provided prescription for atorvastatin 80 mg daily.  It was explained that he will be on these medications indefinitely.      Thank you for the opportunity to participate in the care of Azael Guzman. Please do not hesitate to call with any questions or concerns regarding his cardiovascular status.    ______________________________________________________________________    Subjective:    It was a pleasure to see Mr. Guzman in followup for his coronary artery   disease.       Mr. Guzman is a pleasant 41-year-old gentleman who presented with an acute   anterior myocardial infarction in 05/2014. It appears that he had symptoms   for about 5 to 8 hours before presenting to the [facility] emergency   department where he was found to have anterior ST elevation with an elevated   troponin. He was transferred to [facility] and urgent coronary angiography   was performed. This showed a completely occluded LAD, which was successfully   revascularized with the patient receiving Promus 3.0 x 0.20 mm drug-eluting   stent.      He has generally done well since then. He denies any recurrent chest discomfort or shortness of breath.  He is trying to lose weight which he states has been difficult.  He has also had difficulty paying for his medications, and last year stopped all his medications.  These have since been  restarted gradually.  Currently he is on aspirin 81 mg daily and Toprol-XL 25 mg daily.    His recent repeat echocardiogram did show a persistent cardiomyopathy with ejection fraction of 40%.  He will therefore also benefit from ACE inhibition, and was started on lisinopril 5 mg daily.    Finally, the importance of statin therapy was emphasized, and a prescription for atorvastatin 80 mg daily was again provided.       ______________________________________________________________________    Problem List:  Patient Active Problem List   Diagnosis     Obesity     Coronary Artery Disease     Cardiomyopathy     Warts     Plantar Warts     Limb Pain     Contusion Of The Left Hand With Intact Skin Surface     Contusion Of The Right Leg With Intact Skin Surface     Hypertension     Hyperlipidemia     Scrotal trauma     Myocardial infarction       Medical History:  Past Medical History:   Diagnosis Date     Cardiomyopathy      Coronary artery disease      High cholesterol      Hyperlipidemia      Hypertension      MI (myocardial infarction) 5/2014     Seizure     at age [age]. none since       Surgical History:  Past Surgical History:   Procedure Laterality Date     ANGIOPLASTY       CARDIAC CATHETERIZATION       CORONARY STENT PLACEMENT  5/14    LAD     JOINT REPLACEMENT       ORCHIECTOMY Right 7/31/2015    Procedure: Scrotal Exploration, Evacuation of Hematoma, Repair of Right Testical;  Surgeon: Azael Woods MD;  Location: [facility];  Service:        Social History:  Social History     Social History     Marital status: Single     Spouse name: N/A     Number of children: N/A     Years of education: N/A     Occupational History     Not on file.     Social History Main Topics     Smoking status: Former Smoker     Quit date: 5/18/2014     Smokeless tobacco: Never Used     Alcohol use Yes      Comment: once a month     Drug use: No     Sexual activity: Not on file     Other Topics Concern     Not on file  "    Social History Narrative       Review of Systems: 12 organ system review done and negative except as noted in the HPI.    Family History:  Family History   Problem Relation Age of Onset     Heart disease Mother      Heart disease Maternal Aunt      Heart attack Maternal Uncle      Heart disease Maternal Grandmother      Heart disease Maternal Grandfather          Allergies:  Allergies   Allergen Reactions     Codeine      \"emotional lability\"       Medications:  Current Outpatient Prescriptions   Medication Sig Dispense Refill     aspirin 81 mg chewable tablet Chew 81 mg daily.       metoprolol succinate (TOPROL-XL) 25 MG Take 1 tablet (25 mg total) by mouth daily. 90 tablet 4     atorvastatin (LIPITOR) 80 MG tablet Take 1 tablet (80 mg total) by mouth at bedtime. 31 tablet 11     lisinopril (PRINIVIL,ZESTRIL) 5 MG tablet Take 1 tablet (5 mg total) by mouth daily. 31 tablet 11     No current facility-administered medications for this visit.        Objective:   Vital signs:  /80 (Patient Site: Left Arm, Patient Position: Sitting, Cuff Size: Adult Large)  Pulse 72  Resp 18  Ht 5' 11\" (1.803 m)  Wt (!) 344 lb (156 kg)  BMI 47.98 kg/m2      Physical Exam:    GENERAL APPEARANCE: Alert, cooperative and in no acute distress.  HEENT: No scleral icterus. No Xanthelasma. Oral mucuos membranes pink and moist.  NECK: No JVD. Thyroid not visualized  CHEST: clear to auscultation  CARDIOVASCULAR: S1, S2 regular. No significant murmur noted.   PULSES: Radial and posterior tibial pulses are intact and symmetric.   ABDOMEN: Nontender. BS+. No bruits.  EXTREMITIES: No cyanosis, clubbing or edema.  SKIN: Warm, well perfused  NEURO: Grossly nonfocal    Lab Results:  LIPIDS:  Lab Results   Component Value Date    CHOL 134 07/06/2015    CHOL 202 (H) 05/19/2014     Lab Results   Component Value Date    HDL 34 (L) 07/06/2015    HDL 27 (L) 05/19/2014     Lab Results   Component Value Date    LDLCALC 51 07/06/2015    LDLCALC " 123 05/19/2014     Lab Results   Component Value Date    TRIG 247 (H) 07/06/2015    TRIG 259 (H) 05/19/2014     No components found for: CHOLHDL    BMP:  Lab Results   Component Value Date    CREATININE 1.00 10/20/2017    BUN 9 10/20/2017     10/20/2017    K 4.7 10/20/2017     (H) 10/20/2017    CO2 25 10/20/2017         ECG and Cath films independently reviewed      RAJEEV CROSS MD  [facility]

## 2021-06-13 NOTE — PROGRESS NOTES
Assessment/Plan:     1. Ischemic cardiomyopathy with systolic dysfunction, NYHA class II: Azael Guzman appears well compensated.  He has no signs and symptoms of fluid retention except he occasionally gets short of breath with exertion and fatigue.  His last echo done in March 2017 showed an EF of 40%.  Patient reported that he stopped taking all his heart failure medications 2 months ago because he ran out of the medication and never refilled it.  Patient does not monitor his weight at home but he has gained about 10 pounds since he was last seen in heart failure clinic in March 2017.  He reported not being compliant with the low-salt intake.  Patient also requested for work restriction letter in regards to his previous MI and heart failure.    No medication changes made today.  Checking his BMP, BNP, and TSH level.  I encouraged patient to stay on a low-sodium diet less than 2 g per day, monitor weight daily, and stay physically active as tolerated.  Patient was recommended to have a repeat echo ASAP which he agreed.  Will discuss with CHRISTIANNE Almaguer about the letter for work restriction.      Heart failure treatment includes:  - Beta blocker therapy with metroprolol succinate 50 mg daily-not taking for the last 2 months  - ACEI therapy with lisinopril 10 mg daily-not taking for the last 2 months.  - Last BMP was done on 3/27/2017 and was stable.  - Diuretic therapy-none    2. CAD with s/p MI in 2014 with PCI to LAD: Patient stopped taking atorvastatin.  He has been taking aspirin 81 daily.  He stated he ran out of prescription 2 months ago and decided not to take it.  He also stated that he feels better without taking the medication.  Patient reported occasional chest tingling sensation lasts for 5 minutes not associated with shortness of breath, radiation towards arm or neck, lightheaded or dizziness.  He reports feeling fatigue occasionally with dyspnea on exertion.  He was last seen by Dr. Harrington in  November 2016.  He denies having any chest pain today.      Follow-up with Dr. Harrington in 2-4 weeks or sooner if possible.  Follow-up in heart failure clinic in 6 weeks.  Patient was recommended to go to the emergency department if having persistent chest pain  and shortness of breath.    Subjective:     Azael Guzman is seen at [facility] heart failure clinic today for continued follow.  Patient has missed appointment with CHRISTIANNE Ricci  about 4 times already.  He follows up for his ischemic cardiomyopathy with systolic dysfunction.  His most recent echo done in March done and 17 showed an EF of 40%.  His past medical history is significant for HTN, CAD with MI in 2014, S/P PCI to his LAD, and hyperlipidemia.  Patient has last seen Dr. Harrington in November 2016 and he was last seen in heart failure clinic and March 2017.    During the last HF clinic visit, his lisinopril dose was increased to 10 mg daily.  He had a repeat BMP that was stable.  Patient was recommended to follow-up in 3 weeks but he never followed through.  Today patient comes into heart failure clinic stating that he stopped taking all his heart failure medication and his atorvastatin for the last 2 months because he ran out of the prescription and never refilled it.  He further reported that he feels better not taking medications.  Patient reported that he has been having occasional chest tingling that lasts for 5 minutes.  He also reports occasional fatigue and shortness of breath on exertion.  He has also been noncompliant with the low-salt diet and has been gaining weight.  He gained about 10 pounds since he was last seen in March 2017. He denies lightheadedness, shortness of breath, orthopnea, PND, palpitations, chest pain, abdominal fullness/bloating and lower extremity edema.  Patient reported he was hit by a ball and developed a bruise on his left lower quadrant abdominal area.    Patient works 2 jobs.  He does not monitor his  weight at home or does not do any regular physical exercise.    Review of Systems:   General: WNL  Eyes: WNL  Ears/Nose/Throat: WNL  Lungs: WNL  Heart: Arm Pain, Shortness of Breath with activity, Irregular Heartbeat, fatigue, occasional chest tingling and shortness of breath.  Stomach: WNL  Bladder: WNL  Muscle/Joints: WNL  Skin: WNL  Nervous System: WNL  Mental Health: WNL     Blood: WNL     Patient Active Problem List   Diagnosis     Obesity     Coronary Artery Disease     Cardiomyopathy     Warts     Plantar Warts     Limb Pain     Contusion Of The Left Hand With Intact Skin Surface     Contusion Of The Right Leg With Intact Skin Surface     Hypertension     Hyperlipidemia     Scrotal trauma     Myocardial infarction       Past Medical History:   Diagnosis Date     Cardiomyopathy      Coronary artery disease      High cholesterol      Hyperlipidemia      Hypertension      MI (myocardial infarction) 5/2014     Seizure     at age [age]. none since       Past Surgical History:   Procedure Laterality Date     ANGIOPLASTY       CARDIAC CATHETERIZATION       CORONARY STENT PLACEMENT  5/14    LAD     JOINT REPLACEMENT       ORCHIECTOMY Right 7/31/2015    Procedure: Scrotal Exploration, Evacuation of Hematoma, Repair of Right Testical;  Surgeon: Azael Woods MD;  [facility];  Service:        Family History   Problem Relation Age of Onset     Heart disease Mother      Heart disease Maternal Aunt      Heart attack Maternal Uncle      Heart disease Maternal Grandmother      Heart disease Maternal Grandfather        Social History     Social History     Marital status: Single     Spouse name: N/A     Number of children: N/A     Years of education: N/A     Occupational History     Not on file.     Social History Main Topics     Smoking status: Former Smoker     Quit date: 5/18/2014     Smokeless tobacco: Never Used     Alcohol use Yes      Comment: once a month     Drug use: No     Sexual activity:  "Not on file     Other Topics Concern     Not on file     Social History Narrative       Current Outpatient Prescriptions   Medication Sig Dispense Refill     aspirin 81 MG EC tablet Take 1 tablet (81 mg total) by mouth daily. 150 tablet 2     nitroglycerin (NITROSTAT) 0.4 MG SL tablet Place 1 tablet (0.4 mg total) under the tongue every 5 (five) minutes as needed for chest pain. 25 tablet 2     atorvastatin (LIPITOR) 80 MG tablet Take 1 tablet (80 mg total) by mouth bedtime. 90 tablet 4     lisinopril (PRINIVIL,ZESTRIL) 10 MG tablet Take 1 tablet (10 mg total) by mouth daily. 90 tablet 3     lisinopril (PRINIVIL,ZESTRIL) 5 MG tablet TAKE 1 TABLET BY MOUTH EVERY DAY 90 tablet 3     metoprolol succinate (TOPROL-XL) 25 MG Take 2 tablets (50 mg total) by mouth daily. 60 tablet 11     No current facility-administered medications for this visit.        Allergies   Allergen Reactions     Codeine      \"emotional lability\"       Objective:     Vitals:    10/20/17 1540   BP: 126/88   Pulse: 80   Resp: 16     Wt Readings from Last 3 Encounters:   10/20/17 (!) 343 lb (155.6 kg)   03/27/17 (!) 334 lb (151.5 kg)   02/08/17 (!) 324 lb (147 kg)       General Appearance:   Alert, cooperative and in no acute distress.   HEENT:  No scleral icterus; the mucous membranes were pink and moist.   Neck: JVP is difficult to assess due to the patient's obesity and body habitus.    Chest: The spine was straight. The chest was symmetric.   Lungs:   Respirations unlabored; the lungs are clear to auscultation.   Cardiovascular:   Regular rhythm. S1 and S2 without murmur, clicks or rubs. Radial and posterior tibial pulses are intact and symmetrical.    Abdomen:  Soft, nontender, nondistended, bowel sounds present except soft bruise on left lower quadrant with mild tender to touch   Extremities: No cyanosis, clubbing, or no edema.   Skin: No xanthelasma.   Neurologic: Mood and affect are appropriate.         Lab Review   Lab Results   Component " Value Date    CREATININE 0.85 03/27/2017    BUN 11 03/27/2017     03/27/2017    K 4.1 03/27/2017     (H) 03/27/2017    CO2 22 03/27/2017     No results found for: BNP  No results found for: BNP  Creatinine (mg/dL)   Date Value   03/27/2017 0.85   12/07/2016 0.99   07/31/2015 0.95   01/27/2015 0.92       Cardiographics  Echo on 3/20/2017  Summary     Left Ventricle: The estimated left ventricular ejection fraction is 40%. This represents a moderately decreased ejection fraction. Cavity is mildly increased.    Right Ventricle: Normal size and systolic function. TAPSE is normal, which is consistent with normal right ventricular systolic function.    No hemodynamically significant valvular heart abnormalities.    When compared to the previous study dated 11/18/2016, there is mild interval improvement in LF systolic function.         Echo on 11/18/2016  Conclusions       Summary   Left ventricular ejection fraction is visually estimated to be 25 %.   Overall left ventricular systolic function is severely depressed.   No significant valvular abnormalities.  Except noted  30  minutes were spent face to face with the patient with greater than 50% spent on education and counseling.      Jacqueline Justin Pending sale to [facility]   Heart Failure Clinic

## 2021-06-14 NOTE — PROGRESS NOTES
Assessment/Plan:     1. Ischemic cardiomyopathy with systolic dysfunction, NYHA class II: Azael Guzman appears well compensated.  No signs of fluid retention today.  I increased his metoprolol to 25 mg twice a day.  BMP and magnesium pending due to muscle cramps.  We discussed the importance of monitoring daily weights and following a low-sodium diet.  I encouraged him to start exercising on a regular basis.      Heart failure treatment includes:  - Beta blocker therapy with metroprolol succinate 25 mg twice a day  - ACEI therapy with lisinopril 5 mg daily    Follow-up in the heart failure clinic in 3 weeks and with Dr. Harrington in November 2019    Subjective:     Azael Guzman is seen at [facility] heart failure clinic today for continued follow-up.  He follows up for ischemic cardiac myopathy with systolic dysfunction.  His most recent echocardiogram was done October 30, 2017 which showed an ejection fraction of 40%.  He is a past medical history significant for hypertension, coronary artery disease, and hyperlipidemia.  He had a MI in 2014 which he received a PCI to his LAD.  He was seen in the heart failure clinic a month ago due to him stopping his medications.    During the last clinic visit, Dr. Harrington started him on lisinopril 5 mg daily.  He states he has tolerated this medication.  He states he has occasional shortness of breath with exertion.  He denies any dizziness or lightheadedness.  He has occasional fatigue.  He denies any chest pain.  He denies any orthopnea or PND.  Today, he complains of some muscle cramps during the night.  Denies lightheadedness, shortness of breath, orthopnea, PND, palpitations, chest pain, abdominal fullness/bloating and lower extremity edema.      He is not monitoring home weights.   He is trying to follow a low sodium diet.  He states he is going to start beginning to exercise.    Review of Systems:   General: WNL  Eyes: WNL  Ears/Nose/Throat:  WNL  Lungs: WNL  Heart: WNL  Stomach: WNL  Bladder: WNL  Muscle/Joints: WNL  Skin: WNL  Nervous System: WNL  Mental Health: WNL     Blood: WNL     Patient Active Problem List   Diagnosis     Obesity     Coronary Artery Disease     Ischemic cardiomyopathy     Warts     Plantar Warts     Limb Pain     Contusion Of The Left Hand With Intact Skin Surface     Contusion Of The Right Leg With Intact Skin Surface     Hypertension     Hyperlipidemia     Scrotal trauma     Myocardial infarction       Past Medical History:   Diagnosis Date     Cardiomyopathy      Coronary artery disease      High cholesterol      Hyperlipidemia      Hypertension      MI (myocardial infarction) 5/2014     Seizure     at age [age]. none since       Past Surgical History:   Procedure Laterality Date     ANGIOPLASTY       CARDIAC CATHETERIZATION       CORONARY STENT PLACEMENT  5/14    LAD     JOINT REPLACEMENT       ORCHIECTOMY Right 7/31/2015    Procedure: Scrotal Exploration, Evacuation of Hematoma, Repair of Right Testical;  Surgeon: Azael Woods MD;  Location: [facility];  Service:        Family History   Problem Relation Age of Onset     Heart disease Mother      Heart disease Maternal Aunt      Heart attack Maternal Uncle      Heart disease Maternal Grandmother      Heart disease Maternal Grandfather        Social History     Social History     Marital status: Single     Spouse name: N/A     Number of children: N/A     Years of education: N/A     Occupational History     Not on file.     Social History Main Topics     Smoking status: Former Smoker     Quit date: 5/18/2014     Smokeless tobacco: Never Used     Alcohol use Yes      Comment: once a month     Drug use: No     Sexual activity: Not on file     Other Topics Concern     Not on file     Social History Narrative       Current Outpatient Prescriptions   Medication Sig Dispense Refill     aspirin 81 mg chewable tablet Chew 81 mg daily.       atorvastatin (LIPITOR) 80 MG  "tablet Take 1 tablet (80 mg total) by mouth at bedtime. 31 tablet 11     lisinopril (PRINIVIL,ZESTRIL) 5 MG tablet Take 1 tablet (5 mg total) by mouth daily. 31 tablet 11     metoprolol succinate (TOPROL-XL) 25 MG Take 1 tablet (25 mg total) by mouth 2 (two) times a day. 180 tablet 0     No current facility-administered medications for this visit.        Allergies   Allergen Reactions     Codeine      \"emotional lability\"       Objective:     Vitals:    11/20/17 1528   BP: 122/78   Pulse: 80   Resp: 16     Wt Readings from Last 3 Encounters:   11/20/17 (!) 346 lb (156.9 kg)   11/06/17 (!) 344 lb (156 kg)   10/30/17 (!) 343 lb (155.6 kg)       General Appearance:   Alert, cooperative and in no acute distress.   HEENT:  No scleral icterus; the mucous membranes were pink and moist.   Neck: JVP is difficult to assess due to the patient's obesity and body habitus.    Chest: The spine was straight. The chest was symmetric.   Lungs:   Respirations unlabored; the lungs are clear to auscultation.   Cardiovascular:   Regular rhythm. S1 and S2 without murmur, clicks or rubs. Radial and posterior tibial pulses are intact and symmetrical.    Abdomen:  Soft, nontender, nondistended, bowel sounds present   Extremities: No cyanosis, clubbing, or edema.   Skin: No xanthelasma.   Neurologic: Mood and affect are appropriate.         Lab Review   BMP and Mg pending          Cardiographics  Echocardiogram: 10/30/2017  Summary     Left Ventricle: The estimated left ventricular ejection fraction is 40%. This represents a moderately decreased ejection fraction. Cavity is mildly increased.    Right Ventricle: Right ventricle not well visualized. Normal size and systolic function.    Left Atrium: Left atrial volume is moderately increased.           25 minutes were spent face to face with the patient with greater than 50% spent on education and counseling.      Rosina Robles, [facility]   Heart Failure Clinic      "

## 2021-06-15 PROBLEM — I21.9 MYOCARDIAL INFARCTION (H): Status: ACTIVE | Noted: 2017-03-27

## 2021-06-24 NOTE — TELEPHONE ENCOUNTER
Please call patient to schedule Establish Care fasting Physical.  Patient has not been seen over a year.    Please change PCP if patient is being seen by another provider.

## 2021-06-29 NOTE — PROGRESS NOTES
Progress Notes by Flori Billings MD at 9/9/2020  1:50 PM     Author: Flori Billings MD Service: -- Author Type: Physician    Filed: 9/9/2020  2:36 PM Encounter Date: 9/9/2020 Status: Signed    : Flori Billings MD (Physician)           Click to link to [facility] HEART CARE NOTE    Thank you, Dr. Mora, for asking me to see Azael Guzman in consultation at [facility] to have cardiology clearance for testerone replacement therapy.      Assessment/Plan:   1.  Cardiology clearance prior to testerone replacement therapy: The patient has a significantly reduced testerone level.  He has been evaluated for testerone replacement therapy.  Obviously, for long-term testerone replacement therapy, it is increased the risk of heart attack, sudden cardiac arrest and congestive heart failure.  The patient did not have a cardiology follow-up for more than 3 years, he did not take any cardiac medications because he has no medical insurance.  I discussed and emphasized the importance to restart his cardiac medication metoprolol, lisinopril, aspirin and Lipitor.  Also discussed the risk stratification prior to testerone replacement therapy.  The patient needs to have exercise a stress nuclear test.  But the patient states that he most likely does not have money to do the nuclear stress test.  He also has no interest medical insurance. I told the patient that the risk stratification cannot be assessed if he has no nuclear stress test done.  The patient clearly states that it does not matter what is the nuclear stress test findings, he wants to have testerone replacement therapy.  He clearly told me that he will take the responsibility.  He clearly understand the risk of acute heart attack, sudden cardiac arrest and congestive heart failure with the treatment of testerone replacement therapy.    2.  Coronary artery disease: He denies any chest pain.  He states that his dyspnea on exertion  has been stable.  Restart his cardiac medications including aspirin, Lipitor, metoprolol and lisinopril.    3.  Ischemic cardiomyopathy, LVEF of 40%: The patient is in euvolemic status, no signs of for fluid retention.  No indication of for diuretics.  Restart metoprolol succinate and lisinopril.    4.  Dyslipidemia: His LDL level was significantly elevated since he has been of Lipitor 80 mg at bedtime.  Restart Lipitor 80 mg at bedtime, recheck lipid profile and liver function in Dr. Mora's office 2 months.    Please fax this letter to Dr. Moody at [phone].    Thank you for the opportunity to be involved in the care of Azael Guzman. If you have any questions, please feel free to contact me.  I will see the patient again in 1 year and as needed.      Much or all of the text in this note was generated through the use of Dragon Dictate voice-to-text software. Errors in spelling or words which seem out of context are unintentional.   Sound alike errors, in particular, may have escaped editing.       History of Present Illness:   It is my pleasure to see Azael Guzman at the [facility] clinic for evaluation of Consult. Azael Guzman is a 45 y.o. male with a medical history of coronary artery disease status post MI and PHYLICIA to LAD in 2014, ischemic cardiomyopathy, LVEF of 40%, essential hypertension, dyslipidemia, morbid obesity.    The patient presents to cardiology clinic for cardiology clearance of testerone replacement therapy.  The patient denies any chest pain.  He has dyspnea on exertion which has been stable.  He denies any palpitations, dizziness, orthopnea, PND or leg edema.  The patient states that he gained 100 pounds since 2014 after he had a heart attack.  He lost medical insurance.  He did not have a cardiology follow-up more than 3 years.  He has been of his cardiac medications.  His blood pressure is in normal range, heart rate mildly elevated.    Past Medical  History:     Patient Active Problem List   Diagnosis   ? Obesity   ? Coronary Artery Disease   ? Ischemic cardiomyopathy   ? Warts   ? Plantar Warts   ? Limb Pain   ? Contusion Of The Left Hand With Intact Skin Surface   ? Contusion Of The Right Leg With Intact Skin Surface   ? Hypertension   ? Hyperlipidemia   ? Scrotal trauma   ? Myocardial infarction (H)       Past Surgical History:     Past Surgical History:   Procedure Laterality Date   ? ANGIOPLASTY     ? CARDIAC CATHETERIZATION     ? CORONARY STENT PLACEMENT  5/14    LAD   ? JOINT REPLACEMENT     ? ORCHIECTOMY Right 7/31/2015    Procedure: Scrotal Exploration, Evacuation of Hematoma, Repair of Right Testical;  Surgeon: Azael Woods MD;  Location: [facility] [place];  Service:        Family History:     Family History   Problem Relation Age of Onset   ? Heart disease Mother    ? Heart disease Maternal Aunt    ? Heart attack Maternal Uncle    ? Heart disease Maternal Grandmother    ? Heart disease Maternal Grandfather        Social History:    reports that he quit smoking about 6 years ago. He has never used smokeless tobacco. He reports current alcohol use. He reports that he does not use drugs.    Review of Systems:   General: WNL  Eyes: WNL  Ears/Nose/Throat: WNL  Lungs: WNL  Heart: Shortness of Breath with activity, Leg Swelling  Stomach: WNL  Bladder: WNL  Muscle/Joints: WNL  Skin: WNL  Nervous System: WNL  Mental Health: Depression     Blood: WNL    Meds:     Current Outpatient Medications:   ?  aspirin 81 mg chewable tablet, Chew 1 tablet (81 mg total) daily., Disp: 30 tablet, Rfl: 11  ?  atorvastatin (LIPITOR) 80 MG tablet, Take 1 tablet (80 mg total) by mouth at bedtime., Disp: 31 tablet, Rfl: 11  ?  lisinopriL (PRINIVIL,ZESTRIL) 5 MG tablet, Take 1 tablet (5 mg total) by mouth daily., Disp: 31 tablet, Rfl: 11  ?  metoprolol succinate (TOPROL-XL) 25 MG, Take 1 tablet (25 mg total) by mouth 2 (two) times a day., Disp: 30 tablet, Rfl: 11  "    Allergies:   Codeine    Objective:      Physical Exam  (!) 376 lb (170.6 kg)  5' 11\" (1.803 m)  Body mass index is 52.44 kg/m .  /84 (Patient Site: Right Arm, Patient Position: Sitting, Cuff Size: Adult Large)   Pulse (!) 105   Resp 20   Ht 5' 11\" (1.803 m)   Wt (!) 376 lb (170.6 kg)   SpO2 95%   BMI 52.44 kg/m      General Appearance:   Awake, Alert, No acute distress.   HEENT:  Pupil equal, reactive to light. No scleral icterus; the mucous membranes were moist. No oral ulcers or thrush.    Neck: No cervical bruits. No JVD. No thyromegaly. No lymph node enlargement or tenderness.   Chest: The spine was straight. The chest was symmetric.   Lungs:   Respirations unlabored. Lungs are clear to auscultation. No crackles. No wheezing.   Cardiovascular:   RRR, normal first and second heart sounds with no murmurs. No rubs or gallops.    Abdomen:  Obese. Soft. No tenderness. Non-distended. Bowels sounds are present   Extremities: Equal posterior tibial pulses. No leg edema.   Skin: No rashes or ulcers. Warm, Dry.   Musculoskeletal: No tenderness. No deformity.   Neurologic: Mood and affect are appropriate. No focal deficits.         Cardiac Imaging Studies  ECHO on 10-:    Left Ventricle: The estimated left ventricular ejection fraction is 40%. This represents a moderately decreased ejection fraction. Cavity is mildly increased.    Right Ventricle: Right ventricle not well visualized. Normal size and systolic function.    Left Atrium: Left atrial volume is moderately increased.    Lab Review   Lab Results   Component Value Date     11/20/2017    K 4.2 11/20/2017     11/20/2017    CO2 22 11/20/2017    BUN 9 11/20/2017    CREATININE 0.88 11/20/2017    CALCIUM 9.5 11/20/2017     Lab Results   Component Value Date    WBC 13.4 (H) 07/31/2015    HGB 13.9 (L) 07/31/2015    HCT 39.6 (L) 07/31/2015    MCV 84 07/31/2015     07/31/2015     Lab Results   Component Value Date    CHOL 134 " 07/06/2015    TRIG 247 (H) 07/06/2015    HDL 34 (L) 07/06/2015     Lab Results   Component Value Date    TROPONINI <0.01 07/31/2015     Lab Results   Component Value Date    BNP 14 10/20/2017     Lab Results   Component Value Date    TSH 1.11 10/20/2017

## 2021-08-29 ENCOUNTER — HEALTH MAINTENANCE LETTER (OUTPATIENT)
Age: 46
End: 2021-08-29

## 2021-10-21 ENCOUNTER — NURSE TRIAGE (OUTPATIENT)
Dept: NURSING | Facility: CLINIC | Age: 46
End: 2021-10-21

## 2021-10-22 NOTE — TELEPHONE ENCOUNTER
"Pt reports tested negative yesterday for Covid, \"stuffy nose, cough, headache, temp 100.0, feel like breathing normal\". Symptoms started four days ago. Pt is mainly worried about what number he should be concerned about with his pulse oximeter which he says he purchased at [employer] for $26.00. Pt denies he is having difficulty breathing, new onsent shortness of breath, chest pain or other pain and states pulse ox is in the 90's.    Advised pt on home care for cold symptoms per Care Advice, see below. Advised pt Writer has no information on accuracy of monitor he is using so cannot comment, in high quality pulse oximeters mid to upper 90's is good, dropping below 90's consistently is very concerning. Advised pt if he feels short of breath he should go to the ER.     Pt verbalizes understanding and agrees to plan.     Reason for Disposition    Cold with no complications    Additional Information    Negative: Severe difficulty breathing (e.g., struggling for each breath, speaks in single words)    Negative: Sounds like a life-threatening emergency to the triager    Negative: Runny nose is caused by pollen or other allergies    Negative: Cough is main symptom    Negative: Severe sore throat    Negative: Fever > 104 F (40 C)    Negative: [1] Difficulty breathing AND [2] not severe AND [3] not from stuffy nose (e.g., not relieved by cleaning out the nose)    Negative: Patient sounds very sick or weak to the triager    Negative: [1] Fever > 101 F (38.3 C) AND [2] age > 60    Negative: [1] Fever > 100.0 F (37.8 C) AND [2] bedridden (e.g., nursing home patient, CVA, chronic illness, recovering from surgery)    Negative: [1] Fever > 100.0 F (37.8 C) AND [2] diabetes mellitus or weak immune system (e.g., HIV positive, cancer chemo, splenectomy, organ transplant, chronic steroids)    Negative: Fever present > 3 days (72 hours)    Negative: [1] Fever returns after gone for over 24 hours AND [2] symptoms worse or not improved    " Negative: [1] Sinus pain (not just congestion) AND [2] fever    Negative: Earache    Negative: [1] SEVERE sore throat AND [2] present > 24 hours    Negative: [1] Sinus congestion (pressure, fullness) AND [2] present > 10 days    Negative: [1] Nasal discharge AND [2] present > 10 days    Negative: [1] Using nasal washes and pain medicine > 24 hours AND [2] sinus pain (lower forehead, cheekbone, or eye) persists    Negative: Sores with yellow scabs around the nasal opening    Protocols used: COMMON COLD-A-AH

## 2021-10-24 ENCOUNTER — HEALTH MAINTENANCE LETTER (OUTPATIENT)
Age: 46
End: 2021-10-24

## 2022-10-15 ENCOUNTER — HEALTH MAINTENANCE LETTER (OUTPATIENT)
Age: 47
End: 2022-10-15

## 2023-11-04 ENCOUNTER — HEALTH MAINTENANCE LETTER (OUTPATIENT)
Age: 48
End: 2023-11-04